# Patient Record
Sex: FEMALE | Race: WHITE | NOT HISPANIC OR LATINO | Employment: OTHER | URBAN - METROPOLITAN AREA
[De-identification: names, ages, dates, MRNs, and addresses within clinical notes are randomized per-mention and may not be internally consistent; named-entity substitution may affect disease eponyms.]

---

## 2023-05-09 LAB
CALCIDIOL (25 OH VITAMIN D3) (NG/ML) IN SER/PLAS: 67 NG/ML
THYROTROPIN (MIU/L) IN SER/PLAS BY DETECTION LIMIT <= 0.05 MIU/L: 1.28 MIU/L (ref 0.44–3.98)

## 2023-11-14 ENCOUNTER — HOSPITAL ENCOUNTER (OUTPATIENT)
Dept: CARDIOLOGY | Facility: HOSPITAL | Age: 73
Discharge: HOME | End: 2023-11-14
Payer: MEDICARE

## 2023-11-14 DIAGNOSIS — R07.89 CHEST TIGHTNESS: ICD-10-CM

## 2023-11-14 LAB
AORTIC VALVE PEAK VELOCITY: 1.32
AV PEAK GRADIENT: 7
AVA (PEAK VEL): 1.69
EJECTION FRACTION APICAL 4 CHAMBER: 59.2
EJECTION FRACTION: 60
LEFT VENTRICLE INTERNAL DIMENSION DIASTOLE: 4 (ref 3.5–6)
LEFT VENTRICULAR OUTFLOW TRACT DIAMETER: 1.9
MITRAL VALVE E/A RATIO: 1.08
RIGHT VENTRICLE PEAK SYSTOLIC PRESSURE: 19.2
TRICUSPID ANNULAR PLANE SYSTOLIC EXCURSION: 2.1

## 2023-11-14 PROCEDURE — 93306 TTE W/DOPPLER COMPLETE: CPT | Performed by: INTERNAL MEDICINE

## 2023-11-14 PROCEDURE — 93306 TTE W/DOPPLER COMPLETE: CPT

## 2023-11-17 ENCOUNTER — APPOINTMENT (OUTPATIENT)
Dept: RADIOLOGY | Facility: HOSPITAL | Age: 73
End: 2023-11-17
Payer: MEDICARE

## 2023-11-17 ENCOUNTER — HOSPITAL ENCOUNTER (OUTPATIENT)
Dept: CARDIOLOGY | Facility: HOSPITAL | Age: 73
Discharge: HOME | End: 2023-11-17
Payer: MEDICARE

## 2023-11-17 ENCOUNTER — HOSPITAL ENCOUNTER (OUTPATIENT)
Facility: HOSPITAL | Age: 73
Setting detail: OBSERVATION
Discharge: HOME | End: 2023-11-17
Attending: STUDENT IN AN ORGANIZED HEALTH CARE EDUCATION/TRAINING PROGRAM | Admitting: STUDENT IN AN ORGANIZED HEALTH CARE EDUCATION/TRAINING PROGRAM
Payer: MEDICARE

## 2023-11-17 ENCOUNTER — APPOINTMENT (OUTPATIENT)
Dept: CARDIOLOGY | Facility: HOSPITAL | Age: 73
End: 2023-11-17
Payer: MEDICARE

## 2023-11-17 VITALS
OXYGEN SATURATION: 94 % | BODY MASS INDEX: 26.53 KG/M2 | DIASTOLIC BLOOD PRESSURE: 57 MMHG | TEMPERATURE: 96.6 F | SYSTOLIC BLOOD PRESSURE: 129 MMHG | HEIGHT: 60 IN | RESPIRATION RATE: 17 BRPM | WEIGHT: 135.14 LBS | HEART RATE: 71 BPM

## 2023-11-17 DIAGNOSIS — I49.8 ATRIAL ARRHYTHMIA: ICD-10-CM

## 2023-11-17 DIAGNOSIS — R07.9 CHEST PAIN, UNSPECIFIED TYPE: Primary | ICD-10-CM

## 2023-11-17 LAB
ALBUMIN SERPL BCP-MCNC: 4.3 G/DL (ref 3.4–5)
ALP SERPL-CCNC: 50 U/L (ref 33–136)
ALT SERPL W P-5'-P-CCNC: 16 U/L (ref 7–45)
ANION GAP SERPL CALC-SCNC: 11 MMOL/L (ref 10–20)
AST SERPL W P-5'-P-CCNC: 18 U/L (ref 9–39)
ATRIAL RATE: 87 BPM
BASOPHILS # BLD AUTO: 0.02 X10*3/UL (ref 0–0.1)
BASOPHILS NFR BLD AUTO: 0.2 %
BILIRUB SERPL-MCNC: 0.5 MG/DL (ref 0–1.2)
BUN SERPL-MCNC: 19 MG/DL (ref 6–23)
CALCIUM SERPL-MCNC: 9.9 MG/DL (ref 8.6–10.3)
CARDIAC TROPONIN I PNL SERPL HS: 4 NG/L (ref 0–13)
CARDIAC TROPONIN I PNL SERPL HS: 4 NG/L (ref 0–13)
CARDIAC TROPONIN I PNL SERPL HS: 5 NG/L (ref 0–13)
CHLORIDE SERPL-SCNC: 104 MMOL/L (ref 98–107)
CHOLEST SERPL-MCNC: 221 MG/DL (ref 0–199)
CHOLESTEROL/HDL RATIO: 4.1
CO2 SERPL-SCNC: 29 MMOL/L (ref 21–32)
CREAT SERPL-MCNC: 0.62 MG/DL (ref 0.5–1.05)
EOSINOPHIL # BLD AUTO: 0.22 X10*3/UL (ref 0–0.4)
EOSINOPHIL NFR BLD AUTO: 1.8 %
ERYTHROCYTE [DISTWIDTH] IN BLOOD BY AUTOMATED COUNT: 14.5 % (ref 11.5–14.5)
EST. AVERAGE GLUCOSE BLD GHB EST-MCNC: 117 MG/DL
GFR SERPL CREATININE-BSD FRML MDRD: >90 ML/MIN/1.73M*2
GLUCOSE SERPL-MCNC: 97 MG/DL (ref 74–99)
HBA1C MFR BLD: 5.7 %
HCT VFR BLD AUTO: 46.7 % (ref 36–46)
HDLC SERPL-MCNC: 53.8 MG/DL
HGB BLD-MCNC: 14.7 G/DL (ref 12–16)
IMM GRANULOCYTES # BLD AUTO: 0.06 X10*3/UL (ref 0–0.5)
IMM GRANULOCYTES NFR BLD AUTO: 0.5 % (ref 0–0.9)
LDLC SERPL CALC-MCNC: 141 MG/DL
LYMPHOCYTES # BLD AUTO: 2.16 X10*3/UL (ref 0.8–3)
LYMPHOCYTES NFR BLD AUTO: 17.3 %
MCH RBC QN AUTO: 29.5 PG (ref 26–34)
MCHC RBC AUTO-ENTMCNC: 31.5 G/DL (ref 32–36)
MCV RBC AUTO: 94 FL (ref 80–100)
MONOCYTES # BLD AUTO: 0.96 X10*3/UL (ref 0.05–0.8)
MONOCYTES NFR BLD AUTO: 7.7 %
NEUTROPHILS # BLD AUTO: 9.04 X10*3/UL (ref 1.6–5.5)
NEUTROPHILS NFR BLD AUTO: 72.5 %
NON HDL CHOLESTEROL: 167 MG/DL (ref 0–149)
NRBC BLD-RTO: 0 /100 WBCS (ref 0–0)
P AXIS: 81 DEGREES
P OFFSET: 194 MS
P ONSET: 146 MS
PLATELET # BLD AUTO: 233 X10*3/UL (ref 150–450)
POTASSIUM SERPL-SCNC: 4 MMOL/L (ref 3.5–5.3)
PR INTERVAL: 158 MS
PROT SERPL-MCNC: 7.2 G/DL (ref 6.4–8.2)
Q ONSET: 225 MS
QRS COUNT: 14 BEATS
QRS DURATION: 74 MS
QT INTERVAL: 348 MS
QTC CALCULATION(BAZETT): 418 MS
QTC FREDERICIA: 393 MS
R AXIS: 5 DEGREES
RBC # BLD AUTO: 4.99 X10*6/UL (ref 4–5.2)
SODIUM SERPL-SCNC: 140 MMOL/L (ref 136–145)
T AXIS: 76 DEGREES
T OFFSET: 399 MS
TRIGL SERPL-MCNC: 129 MG/DL (ref 0–149)
VENTRICULAR RATE: 87 BPM
VLDL: 26 MG/DL (ref 0–40)
WBC # BLD AUTO: 12.5 X10*3/UL (ref 4.4–11.3)

## 2023-11-17 PROCEDURE — 93270 REMOTE 30 DAY ECG REV/REPORT: CPT

## 2023-11-17 PROCEDURE — 80053 COMPREHEN METABOLIC PANEL: CPT | Performed by: EMERGENCY MEDICINE

## 2023-11-17 PROCEDURE — 99285 EMERGENCY DEPT VISIT HI MDM: CPT | Mod: 25

## 2023-11-17 PROCEDURE — 93272 ECG/REVIEW INTERPRET ONLY: CPT | Performed by: INTERNAL MEDICINE

## 2023-11-17 PROCEDURE — 2500000001 HC RX 250 WO HCPCS SELF ADMINISTERED DRUGS (ALT 637 FOR MEDICARE OP): Performed by: EMERGENCY MEDICINE

## 2023-11-17 PROCEDURE — 83036 HEMOGLOBIN GLYCOSYLATED A1C: CPT | Performed by: STUDENT IN AN ORGANIZED HEALTH CARE EDUCATION/TRAINING PROGRAM

## 2023-11-17 PROCEDURE — 99239 HOSP IP/OBS DSCHRG MGMT >30: CPT | Performed by: STUDENT IN AN ORGANIZED HEALTH CARE EDUCATION/TRAINING PROGRAM

## 2023-11-17 PROCEDURE — 85025 COMPLETE CBC W/AUTO DIFF WBC: CPT | Performed by: EMERGENCY MEDICINE

## 2023-11-17 PROCEDURE — 36415 COLL VENOUS BLD VENIPUNCTURE: CPT | Performed by: EMERGENCY MEDICINE

## 2023-11-17 PROCEDURE — 84484 ASSAY OF TROPONIN QUANT: CPT | Performed by: STUDENT IN AN ORGANIZED HEALTH CARE EDUCATION/TRAINING PROGRAM

## 2023-11-17 PROCEDURE — 36415 COLL VENOUS BLD VENIPUNCTURE: CPT | Performed by: STUDENT IN AN ORGANIZED HEALTH CARE EDUCATION/TRAINING PROGRAM

## 2023-11-17 PROCEDURE — 71045 X-RAY EXAM CHEST 1 VIEW: CPT | Mod: FY

## 2023-11-17 PROCEDURE — 93005 ELECTROCARDIOGRAM TRACING: CPT | Mod: 59

## 2023-11-17 PROCEDURE — 71045 X-RAY EXAM CHEST 1 VIEW: CPT | Performed by: RADIOLOGY

## 2023-11-17 PROCEDURE — 83036 HEMOGLOBIN GLYCOSYLATED A1C: CPT | Mod: STJLAB | Performed by: STUDENT IN AN ORGANIZED HEALTH CARE EDUCATION/TRAINING PROGRAM

## 2023-11-17 PROCEDURE — 2500000001 HC RX 250 WO HCPCS SELF ADMINISTERED DRUGS (ALT 637 FOR MEDICARE OP): Performed by: STUDENT IN AN ORGANIZED HEALTH CARE EDUCATION/TRAINING PROGRAM

## 2023-11-17 PROCEDURE — G0378 HOSPITAL OBSERVATION PER HR: HCPCS

## 2023-11-17 PROCEDURE — 84484 ASSAY OF TROPONIN QUANT: CPT | Performed by: EMERGENCY MEDICINE

## 2023-11-17 PROCEDURE — 80061 LIPID PANEL: CPT | Performed by: STUDENT IN AN ORGANIZED HEALTH CARE EDUCATION/TRAINING PROGRAM

## 2023-11-17 RX ORDER — ENOXAPARIN SODIUM 100 MG/ML
40 INJECTION SUBCUTANEOUS EVERY 24 HOURS
Status: DISCONTINUED | OUTPATIENT
Start: 2023-11-17 | End: 2023-11-17 | Stop reason: HOSPADM

## 2023-11-17 RX ORDER — NITROGLYCERIN 0.4 MG/1
0.4 TABLET SUBLINGUAL EVERY 5 MIN PRN
Status: DISCONTINUED | OUTPATIENT
Start: 2023-11-17 | End: 2023-11-17 | Stop reason: HOSPADM

## 2023-11-17 RX ORDER — NAPROXEN SODIUM 220 MG/1
324 TABLET, FILM COATED ORAL ONCE
Status: COMPLETED | OUTPATIENT
Start: 2023-11-17 | End: 2023-11-17

## 2023-11-17 RX ORDER — NAPROXEN SODIUM 220 MG/1
81 TABLET, FILM COATED ORAL DAILY
Qty: 30 TABLET | Refills: 0 | Status: SHIPPED | OUTPATIENT
Start: 2023-11-18 | End: 2023-12-04 | Stop reason: WASHOUT

## 2023-11-17 RX ORDER — ACETAMINOPHEN 325 MG/1
650 TABLET ORAL EVERY 4 HOURS PRN
Status: DISCONTINUED | OUTPATIENT
Start: 2023-11-17 | End: 2023-11-17 | Stop reason: HOSPADM

## 2023-11-17 RX ORDER — CHOLECALCIFEROL (VITAMIN D3) 50 MCG
50 TABLET ORAL DAILY
COMMUNITY

## 2023-11-17 RX ORDER — PRAVASTATIN SODIUM 80 MG/1
80 TABLET ORAL DAILY
COMMUNITY

## 2023-11-17 RX ORDER — CALCIUM CARBONATE 500(1250)
2 TABLET ORAL DAILY
COMMUNITY

## 2023-11-17 RX ORDER — ATORVASTATIN CALCIUM 80 MG/1
80 TABLET, FILM COATED ORAL NIGHTLY
Status: DISCONTINUED | OUTPATIENT
Start: 2023-11-17 | End: 2023-11-17 | Stop reason: HOSPADM

## 2023-11-17 RX ORDER — MORPHINE SULFATE 2 MG/ML
2 INJECTION, SOLUTION INTRAMUSCULAR; INTRAVENOUS EVERY 4 HOURS PRN
Status: DISCONTINUED | OUTPATIENT
Start: 2023-11-17 | End: 2023-11-17 | Stop reason: HOSPADM

## 2023-11-17 RX ORDER — IBUPROFEN 100 MG/5ML
1000 SUSPENSION, ORAL (FINAL DOSE FORM) ORAL DAILY
COMMUNITY

## 2023-11-17 RX ORDER — NAPROXEN SODIUM 220 MG/1
81 TABLET, FILM COATED ORAL DAILY
Status: DISCONTINUED | OUTPATIENT
Start: 2023-11-17 | End: 2023-11-17 | Stop reason: HOSPADM

## 2023-11-17 RX ORDER — LANOLIN ALCOHOL/MO/W.PET/CERES
1000 CREAM (GRAM) TOPICAL DAILY
COMMUNITY

## 2023-11-17 RX ORDER — ONDANSETRON HYDROCHLORIDE 2 MG/ML
4 INJECTION, SOLUTION INTRAVENOUS EVERY 6 HOURS PRN
Status: DISCONTINUED | OUTPATIENT
Start: 2023-11-17 | End: 2023-11-17 | Stop reason: HOSPADM

## 2023-11-17 RX ORDER — NITROGLYCERIN 0.4 MG/1
0.4 TABLET SUBLINGUAL ONCE
Status: COMPLETED | OUTPATIENT
Start: 2023-11-17 | End: 2023-11-17

## 2023-11-17 RX ORDER — PROPYLENE GLYCOL 0.06 MG/ML
1 EMULSION OPHTHALMIC 2 TIMES DAILY PRN
COMMUNITY

## 2023-11-17 RX ADMIN — NITROGLYCERIN 0.4 MG: 0.4 TABLET SUBLINGUAL at 06:38

## 2023-11-17 RX ADMIN — ASPIRIN 81 MG CHEWABLE TABLET 81 MG: 81 TABLET CHEWABLE at 10:53

## 2023-11-17 RX ADMIN — ASPIRIN 81 MG CHEWABLE TABLET 324 MG: 81 TABLET CHEWABLE at 06:38

## 2023-11-17 SDOH — SOCIAL STABILITY: SOCIAL INSECURITY: DOES ANYONE TRY TO KEEP YOU FROM HAVING/CONTACTING OTHER FRIENDS OR DOING THINGS OUTSIDE YOUR HOME?: NO

## 2023-11-17 SDOH — SOCIAL STABILITY: SOCIAL INSECURITY: DO YOU FEEL UNSAFE GOING BACK TO THE PLACE WHERE YOU ARE LIVING?: NO

## 2023-11-17 SDOH — SOCIAL STABILITY: SOCIAL INSECURITY: ARE THERE ANY APPARENT SIGNS OF INJURIES/BEHAVIORS THAT COULD BE RELATED TO ABUSE/NEGLECT?: NO

## 2023-11-17 SDOH — SOCIAL STABILITY: SOCIAL INSECURITY: HAS ANYONE EVER THREATENED TO HURT YOUR FAMILY OR YOUR PETS?: NO

## 2023-11-17 SDOH — SOCIAL STABILITY: SOCIAL INSECURITY: ARE YOU OR HAVE YOU BEEN THREATENED OR ABUSED PHYSICALLY, EMOTIONALLY, OR SEXUALLY BY ANYONE?: NO

## 2023-11-17 SDOH — SOCIAL STABILITY: SOCIAL INSECURITY: ABUSE: ADULT

## 2023-11-17 SDOH — SOCIAL STABILITY: SOCIAL INSECURITY: HAVE YOU HAD THOUGHTS OF HARMING ANYONE ELSE?: NO

## 2023-11-17 ASSESSMENT — ACTIVITIES OF DAILY LIVING (ADL)
PATIENT'S MEMORY ADEQUATE TO SAFELY COMPLETE DAILY ACTIVITIES?: YES
WALKS IN HOME: INDEPENDENT
BATHING: INDEPENDENT
ADEQUATE_TO_COMPLETE_ADL: YES
DRESSING YOURSELF: INDEPENDENT
TOILETING: INDEPENDENT
FEEDING YOURSELF: INDEPENDENT
GROOMING: INDEPENDENT
HEARING - RIGHT EAR: FUNCTIONAL
HEARING - LEFT EAR: FUNCTIONAL
JUDGMENT_ADEQUATE_SAFELY_COMPLETE_DAILY_ACTIVITIES: YES

## 2023-11-17 ASSESSMENT — LIFESTYLE VARIABLES
SUBSTANCE_ABUSE_PAST_12_MONTHS: NO
PRESCIPTION_ABUSE_PAST_12_MONTHS: NO
AUDIT-C TOTAL SCORE: 0
HOW OFTEN DO YOU HAVE 6 OR MORE DRINKS ON ONE OCCASION: NEVER
EVER HAD A DRINK FIRST THING IN THE MORNING TO STEADY YOUR NERVES TO GET RID OF A HANGOVER: NO
SKIP TO QUESTIONS 9-10: 1
HOW MANY STANDARD DRINKS CONTAINING ALCOHOL DO YOU HAVE ON A TYPICAL DAY: PATIENT DOES NOT DRINK
AUDIT-C TOTAL SCORE: 0
HOW OFTEN DO YOU HAVE A DRINK CONTAINING ALCOHOL: NEVER
HAVE YOU EVER FELT YOU SHOULD CUT DOWN ON YOUR DRINKING: NO
REASON UNABLE TO ASSESS: NO
HAVE PEOPLE ANNOYED YOU BY CRITICIZING YOUR DRINKING: NO
EVER FELT BAD OR GUILTY ABOUT YOUR DRINKING: NO

## 2023-11-17 ASSESSMENT — COGNITIVE AND FUNCTIONAL STATUS - GENERAL
DAILY ACTIVITIY SCORE: 24
MOBILITY SCORE: 24

## 2023-11-17 ASSESSMENT — COLUMBIA-SUICIDE SEVERITY RATING SCALE - C-SSRS
2. HAVE YOU ACTUALLY HAD ANY THOUGHTS OF KILLING YOURSELF?: NO
1. IN THE PAST MONTH, HAVE YOU WISHED YOU WERE DEAD OR WISHED YOU COULD GO TO SLEEP AND NOT WAKE UP?: NO
2. HAVE YOU ACTUALLY HAD ANY THOUGHTS OF KILLING YOURSELF?: NO
6. HAVE YOU EVER DONE ANYTHING, STARTED TO DO ANYTHING, OR PREPARED TO DO ANYTHING TO END YOUR LIFE?: NO
1. IN THE PAST MONTH, HAVE YOU WISHED YOU WERE DEAD OR WISHED YOU COULD GO TO SLEEP AND NOT WAKE UP?: NO
6. HAVE YOU EVER DONE ANYTHING, STARTED TO DO ANYTHING, OR PREPARED TO DO ANYTHING TO END YOUR LIFE?: NO

## 2023-11-17 ASSESSMENT — PAIN SCALES - GENERAL
PAINLEVEL_OUTOF10: 8
PAINLEVEL_OUTOF10: 8

## 2023-11-17 ASSESSMENT — PATIENT HEALTH QUESTIONNAIRE - PHQ9
1. LITTLE INTEREST OR PLEASURE IN DOING THINGS: NOT AT ALL
2. FEELING DOWN, DEPRESSED OR HOPELESS: NOT AT ALL
SUM OF ALL RESPONSES TO PHQ9 QUESTIONS 1 & 2: 0

## 2023-11-17 ASSESSMENT — PAIN - FUNCTIONAL ASSESSMENT: PAIN_FUNCTIONAL_ASSESSMENT: 0-10

## 2023-11-17 ASSESSMENT — PAIN DESCRIPTION - DESCRIPTORS
DESCRIPTORS: PRESSURE
DESCRIPTORS: HEAVINESS;PRESSURE

## 2023-11-17 ASSESSMENT — PAIN DESCRIPTION - LOCATION: LOCATION: CHEST

## 2023-11-17 ASSESSMENT — PAIN DESCRIPTION - ORIENTATION: ORIENTATION: MID

## 2023-11-17 ASSESSMENT — PAIN DESCRIPTION - PAIN TYPE: TYPE: ACUTE PAIN

## 2023-11-17 NOTE — CONSULTS
Consults  History Of Present Illness:    Prerna Watkins is a 73 y.o. female presenting with chief complaint of chest pain that awoke her from sleep at 1 AM.  The patient did had a negative stress test less than 6 months ago.  On presentation she denied dizziness but reported chest pain 8 out of 10 in intensity worse than in the past..  EKG showed normal sinus rhythm with no ischemic change no clinical arrhythmias she was normal tensive in normal sinus rhythm on presentation.  Her cardiac markers are negative    In November patient was seen and evaluated by nuclear stress and echocardiogram was unremarkable the patient has a past history of cardiac arrhythmia status post ablation November 2017 [facility] per Dr. Rodrigo Curry which was for atrial flutter and paroxysmal atrial fibrillation history of hypertension hyperlipidemia she was treated with atenolol, flecainide reportedly had a remote history of laparoscopic appendectomy    CONCLUSIONS:    1. Left ventricular systolic function is normal with a 65-70% estimated ejection fraction.    2. Spectral Doppler shows an impaired relaxation pattern of left ventricular diastolic filling.    3. RVSP within normal limits.    4. Aortic valve sclerosis.    Nuclear stress test was normal read by Dr. Lin which was pharmacologic    IMPRESSION:  1. No nuclear evidence of pharmacologic stress-induced ischemia or  scar.  2. Vigorous LV function, EF greater than 80%, with normal wall motion  and thickening in all segments.  3. Based on these findings there is low likelihood of flow-limiting  coronary artery disease.      Known to her internist  who felt she had reproducible chest pain and noted previous ablation November 2017 negative stress test nuclear 2006 and recently in the past 6 months he gave the following medical history      EKG-normal  Stress test 2 months ago-good  Suggest echo to evaluate   Chest sore with palpation, movement-has tried Tylenol     Health  Maintenance Due   Topic Date Due   Colorectal Cancer Screening 2021   Influenza Vaccine (1) 2023     MEDICATIONS:  Current Outpatient Medications   Medication Instructions   ascorbic acid (Vitamin C) 500 mg/mL oral liquid Oral, Daily RT   Calcium Carbonate-Vitamin D 600-5 MG-MCG tablet Oral   Cobalamin Combinations (B-12) 100-5000 MCG sublingual tablet Every 24 hours   Cyanocobalamin 3000 MCG/ML liquid Sublingual   fluticasone (Flonase Allergy Relief) 50 MCG/ACT nasal spray Nasal   meclizine (Antivert) 25 MG tablet TAKE 1 TABLET BY MOUTH EVERY 6 HOURS AS NEEDED FOR VERTIGO   Multiple Vitamin (Multi-Vitamin) tablet 1 tablet, Oral, Daily RT   pravastatin (PRAVACHOL) 80 mg, Oral, Daily       ALLERGIES:  Allergies   Allergen Reactions   Hydrocodone Unknown   Levofloxacin   Other Reaction(s): arm/muscle pain   Other Itching   Watery eyes, runny nose   Nickel   Other Reaction(s): Urticaria       PAST MEDICAL HISTORY:  Patient Active Problem List   Diagnosis   Acquired absence of both cervix and uterus   Allergic rhinitis   Rhinitis   Appendicitis   A-fib (CMS/HCC)   Benign paroxysmal positional vertigo of right ear   Bilateral carotid artery stenosis   Bilateral leg cramps   Broken wrist   Chronic fatigue   Closed fracture of ankle   Contact dermatitis   GERD without esophagitis   Hearing loss   Hematuria   Herpes zoster   Insect sting, accidental or unintentional, initial encounter   Lichen sclerosus et atrophicus   HLD (hyperlipidemia) (CMS/HCC)   Mixed hyperlipidemia (CMS/HCC)   Osteoarthritis of knee, unspecified   Sinusitis, maxillary, chronic   Tubular adenoma   Urinary urgency   Vertigo   Routine general medical examination at a health care facility     SURGICAL HISTORY:  Past Surgical History:   Procedure Laterality Date   ANKLE FRACTURE SURGERY Left   APPENDECTOMY 2017   CARDIAC ELECTROPHYSIOLOGY MAPPING AND ABLATION 2017   CATARACT EXTRACTION 2018   OU cataracts    SECTION, LOW  TRANSVERSE   x2   COLONOSCOPY 04/2018   (tubular adenoma) due 2021   EYE SURGERY 1957   HYSTERECTOMY 01/17/2023   total ( Dr. aNsrin Mcdermott)   NUCLEAR STRESS TEST EXERCISE (CARD) 2006   Normal   SLEEP STUDY 2010   normal   WRIST FRACTURE SURGERY Right       FAMILY HISTORY:  Family History   Problem Relation Name Age of Onset   Cirrhosis Mother   Prostate cancer Father   COPD Father   No Known Problems Son   No Known Problems Daughter       SOCIAL HISTORY:  Social History      Last Recorded Vitals:  Vitals:    11/17/23 0715 11/17/23 0804 11/17/23 0840 11/17/23 0900   BP: 128/62 129/61 119/67 129/64   BP Location:    Right arm   Patient Position:    Sitting   Pulse: 68 64 66 69   Resp: 23 19 18 20   Temp:    35.9 °C (96.6 °F)   TempSrc:    Temporal   SpO2: 92% 95% 96% 95%   Weight:       Height:           Last Labs:  CBC - 11/17/2023:  6:12 AM  12.5 14.7 233    46.7      CMP - 11/17/2023:  6:12 AM  9.9 7.2 18 --- 0.5   _ 4.3 16 50      PTT - No results in last year.  _   _ _     Troponin I, High Sensitivity   Date/Time Value Ref Range Status   11/17/2023 06:09 AM 5 0 - 13 ng/L Final      Last I/O:  No intake/output data recorded.    Past Cardiology Tests (Last 3 Years):  EKG:  No results found for this or any previous visit from the past 1095 days.    Echo:  Transthoracic Echo (TTE) Complete 11/14/2023    Ejection Fractions:  EF   Date/Time Value Ref Range Status   11/14/2023 11:00 AM 60       Cath:  No results found for this or any previous visit from the past 1095 days.    Stress Test:  Nuclear Stress Test 5/13/2023    Cardiac Imaging:  No results found for this or any previous visit from the past 1095 days.      Past Medical History:  She has a past medical history of Acute appendicitis with localized peritonitis, without perforation or gangrene.    Past Surgical History:  She has a past surgical history that includes Appendectomy (07/24/2017).      Social History:  She has no history on file for tobacco use, alcohol  use, and drug use.    Family History:  No family history on file.     Allergies:  Patient has no known allergies.    Inpatient Medications:        Outpatient Medications:  Current Outpatient Medications   Medication Instructions    ascorbic acid (VITAMIN C) 1,000 mg, oral, Daily    calcium carbonate (Oscal) 500 mg calcium (1,250 mg) tablet 2 tablets, oral, Daily    cholecalciferol (VITAMIN D-3) 50 mcg, oral, Daily    cyanocobalamin (VITAMIN B-12) 1,000 mcg, oral, Daily    glucosamine/methylsulfonylmeth (GLUCOSAMINE MSM ORAL) 2 tablets, oral, Daily    pravastatin (PRAVACHOL) 80 mg, oral, Daily    propylene glycoL (Systane Balance) 0.6 % drops 1 drop, Both Eyes, 2 times daily PRN     Results for orders placed or performed during the hospital encounter of 11/17/23 (from the past 96 hour(s))   Troponin I, High Sensitivity   Result Value Ref Range    Troponin I, High Sensitivity 5 0 - 13 ng/L   CBC and Auto Differential   Result Value Ref Range    WBC 12.5 (H) 4.4 - 11.3 x10*3/uL    nRBC 0.0 0.0 - 0.0 /100 WBCs    RBC 4.99 4.00 - 5.20 x10*6/uL    Hemoglobin 14.7 12.0 - 16.0 g/dL    Hematocrit 46.7 (H) 36.0 - 46.0 %    MCV 94 80 - 100 fL    MCH 29.5 26.0 - 34.0 pg    MCHC 31.5 (L) 32.0 - 36.0 g/dL    RDW 14.5 11.5 - 14.5 %    Platelets 233 150 - 450 x10*3/uL    Neutrophils % 72.5 40.0 - 80.0 %    Immature Granulocytes %, Automated 0.5 0.0 - 0.9 %    Lymphocytes % 17.3 13.0 - 44.0 %    Monocytes % 7.7 2.0 - 10.0 %    Eosinophils % 1.8 0.0 - 6.0 %    Basophils % 0.2 0.0 - 2.0 %    Neutrophils Absolute 9.04 (H) 1.60 - 5.50 x10*3/uL    Immature Granulocytes Absolute, Automated 0.06 0.00 - 0.50 x10*3/uL    Lymphocytes Absolute 2.16 0.80 - 3.00 x10*3/uL    Monocytes Absolute 0.96 (H) 0.05 - 0.80 x10*3/uL    Eosinophils Absolute 0.22 0.00 - 0.40 x10*3/uL    Basophils Absolute 0.02 0.00 - 0.10 x10*3/uL   Comprehensive metabolic panel   Result Value Ref Range    Glucose 97 74 - 99 mg/dL    Sodium 140 136 - 145 mmol/L    Potassium  4.0 3.5 - 5.3 mmol/L    Chloride 104 98 - 107 mmol/L    Bicarbonate 29 21 - 32 mmol/L    Anion Gap 11 10 - 20 mmol/L    Urea Nitrogen 19 6 - 23 mg/dL    Creatinine 0.62 0.50 - 1.05 mg/dL    eGFR >90 >60 mL/min/1.73m*2    Calcium 9.9 8.6 - 10.3 mg/dL    Albumin 4.3 3.4 - 5.0 g/dL    Alkaline Phosphatase 50 33 - 136 U/L    Total Protein 7.2 6.4 - 8.2 g/dL    AST 18 9 - 39 U/L    Bilirubin, Total 0.5 0.0 - 1.2 mg/dL    ALT 16 7 - 45 U/L       Physical Exam:  Subjective:   Examination:   General Examination:   General Appearance: Well developed, well nourished, in no acute distress.   Head: normocephalic, atraumatic   Eyes: Anicteric sclera. Pupils are equally round and reactive to light.  Extraocular movements are intact.    Ears: normal   Oral: Cavity: mucosa moist.   Throat: clear.   Neck/Thyroid: neck supple, full range of motion, no cervical lymphadenopathy.   Skin: warm and dry, no suspicious lesions.    Heart: regular rate and rhythm, S1, S2 normal, no murmurs.   Lungs: clear to auscultation bilaterally.   Abdomen: soft, non-tender, non-distended, bowl sound present, normal.   Extremities: no clubbing, no cyanosis, no edema.   Neuro: non-focal, motor strength normal upper lower extremities, sensory exam intact.       Assessment/Plan   R07.9 chest pain after recent negative cardiac work-up negative cardiac markers EKG chest pain occurred awakening her from sleep in the past felt to have atypical chest pain reproducible by her internist  and has had several negative stress test including recent and 2006  I48.92 History of ablation 2017 by Dr. Rodrigo Curry    Chest pain appears somewhat atypical this was her first episode awakening her from sleep she has had normal echo and 2 negative stress test nuclear including recent she has no reproducible history of exertional type chest pain 1 possibility is breakthrough atrial arrhythmias ordered an event monitor at discharge after doing 3 cardiac troponins we will  have her follow-up with Dr. Lin who is office did the stress test if she has further episodes of exertional chest pain they can evaluate if an invasive procedure is truly needed    Code Status:  No Order    I spent 40 minutes in the professional and overall care of this patient.        Sp Shannon MD

## 2023-11-17 NOTE — CARE PLAN
The patient's goals for the shift include  remain  afebrile    The clinical goals for the shift include  have  decreased   chest  pain  Problem: Pain - Adult  Goal: Verbalizes/displays adequate comfort level or baseline comfort level  Outcome: Progressing     Problem: Safety - Adult  Goal: Free from fall injury  Outcome: Progressing     Problem: Discharge Planning  Goal: Discharge to home or other facility with appropriate resources  Outcome: Progressing     Problem: Chronic Conditions and Co-morbidities  Goal: Patient's chronic conditions and co-morbidity symptoms are monitored and maintained or improved  Outcome: Progressing

## 2023-11-17 NOTE — CARE PLAN
The patient's goals for the shift include  remain  afebrile    The clinical goals for the shift include  no  chest  pain  Problem: Pain - Adult  Goal: Verbalizes/displays adequate comfort level or baseline comfort level  11/17/2023 1656 by Fatuma Dutton RN  Outcome: Adequate for Discharge  11/17/2023 1520 by Fatuma Dutton RN  Outcome: Progressing     Problem: Safety - Adult  Goal: Free from fall injury  11/17/2023 1656 by Fatuma Dutton RN  Outcome: Adequate for Discharge  11/17/2023 1520 by Fatuma Dutton RN  Outcome: Progressing     Problem: Discharge Planning  Goal: Discharge to home or other facility with appropriate resources  11/17/2023 1656 by Fatuma Dutton RN  Outcome: Adequate for Discharge  11/17/2023 1520 by Fatuma Dutton RN  Outcome: Progressing     Problem: Chronic Conditions and Co-morbidities  Goal: Patient's chronic conditions and co-morbidity symptoms are monitored and maintained or improved  11/17/2023 1656 by Fatuma Dutton RN  Outcome: Adequate for Discharge  11/17/2023 1520 by Fatuma Dutton RN  Outcome: Progressing

## 2023-11-17 NOTE — PROGRESS NOTES
Met with the patient and her  in the room, she gave permission to speak with her  present. She states that she lives at home with her , is independent in her daily activities. She states she has no homegoing concerns, plans home discharge.

## 2023-11-17 NOTE — ED PROVIDER NOTES
HPI   Chief Complaint   Patient presents with    Chest Pain       This is a 73 years old female patient presented to the emergency department with a chief complaint of chest pain.  Patient states the pain woke her up from sleep around 1 AM.  It is in the middle of the chest, feels like heaviness/weight in the middle of her chest.  Patient denies radiation to the pain and stated that the pain has been constant since 1 AM till now.  Denies lightheadedness, dizziness, reported chest pain.  Stated that the pain is 8 out of 10 in severity.  Denies any headache, abdominal pain, diarrhea, constipation, weakness, or focal numbness.  Never had a chest pain like this in the past.    Review of system: As stated above in the HPI section.                          Brittany Coma Scale Score: 15                  Patient History   Past Medical History:   Diagnosis Date    Acute appendicitis with localized peritonitis, without perforation or gangrene     Acute appendicitis with localized peritonitis     Past Surgical History:   Procedure Laterality Date    APPENDECTOMY  07/24/2017    Laparoscopic Appendectomy     No family history on file.  Social History     Tobacco Use    Smoking status: Not on file    Smokeless tobacco: Not on file   Substance Use Topics    Alcohol use: Not on file    Drug use: Not on file       Physical Exam   ED Triage Vitals [11/17/23 0548]   Temp Heart Rate Resp BP   36.6 °C (97.9 °F) 71 20 156/78      SpO2 Temp Source Heart Rate Source Patient Position   98 % Temporal Monitor Sitting      BP Location FiO2 (%)     Right arm --       Physical Exam  Constitutional:       General: She is not in acute distress.     Appearance: She is well-developed. She is not ill-appearing.   Eyes:      Pupils: Pupils are equal, round, and reactive to light.   Cardiovascular:      Rate and Rhythm: Normal rate and regular rhythm.      Heart sounds: Normal heart sounds. Heart sounds not distant. No murmur heard.     No systolic  murmur is present.      No friction rub.   Pulmonary:      Effort: Pulmonary effort is normal. No tachypnea, accessory muscle usage or respiratory distress.      Breath sounds: No stridor.   Musculoskeletal:         General: Normal range of motion.      Cervical back: Normal range of motion.   Skin:     General: Skin is warm and dry.   Neurological:      General: No focal deficit present.      Mental Status: She is alert.         ED Course & MDM   Diagnoses as of 11/17/23 0749   Chest pain, unspecified type       Medical Decision Making  Patient seen and examined, presentation is concerning for acute coronary syndrome.  Will obtain full cardiac work-up including EKG, chest x-ray, and basic labs.  We will also obtain high sensitive troponin.    EKG revealed normal sinus rhythm, rate is 87 bpm, normal WA, QRS, normal QTc duration.  No ST segment or T wave pathology.  Good R wave progression throughout the precordial leads.    Will administer nitroglycerin, and aspirin.    Work-up is unremarkable, patient will be admitted to the hospitalist service given her age, comorbid conditions, and the onset of her chest pain for extended observation/rule out ACS.    Amount and/or Complexity of Data Reviewed  Labs: ordered. Decision-making details documented in ED Course.  Radiology: ordered. Decision-making details documented in ED Course.        Procedure  Procedures     Andrews Isabel, DO  11/17/23 0659       Andrews Isabel, DO  11/17/23 0750

## 2023-11-17 NOTE — DISCHARGE SUMMARY
Discharge Diagnosis  Chest pain    Issues Requiring Follow-Up  Chest pain   Cardiac Monitoring     Discharge Meds     Your medication list        START taking these medications        Instructions Last Dose Given Next Dose Due   aspirin 81 mg chewable tablet  Start taking on: November 18, 2023      Chew 1 tablet (81 mg) once daily. Do not start before November 18, 2023.              CONTINUE taking these medications        Instructions Last Dose Given Next Dose Due   ascorbic acid 1,000 mg tablet  Commonly known as: Vitamin C           calcium carbonate 500 mg calcium (1,250 mg) tablet  Commonly known as: Oscal           cholecalciferol 50 MCG (2000 UT) tablet  Commonly known as: Vitamin D-3           cyanocobalamin 1,000 mcg tablet  Commonly known as: Vitamin B-12           GLUCOSAMINE MSM ORAL           pravastatin 80 mg tablet  Commonly known as: Pravachol           Systane Balance 0.6 % drops  Generic drug: propylene glycoL                     Where to Get Your Medications        These medications were sent to [facility]/pharmacy #[id number] - [place], [place] - [address] [facility] [address]  [id number] [facility] [address]      Phone: [phone]   aspirin 81 mg chewable tablet         Test Results Pending At Discharge  Pending Labs       Order Current Status    Hemoglobin A1c In process            Hospital Course   This is a 73-year-old female who was admitted with atypical chest pain.  Chest pain resolved with aspirin and two nitroglycerin tabs. Cardiology was consulted. Troponins were negative x3. Pt had outpatient echo and stress test in recent months that were reviewed by both hospital medicine and cardiology. Holter monitor placed. Chest pain did not return.  Patient was determined to be medically stable for discharge home with instructions for follow-up.    D/c >30min    Pertinent Physical Exam At Time of Discharge  Physical Exam  Constitutional: Well developed, no distress, alert and  cooperative  Skin: Warm and dry  Eyes: EOMI, clear sclera  ENMT: mucous membranes moist  Respiratory: Patent airways, CTAB  Cardiovascular: Regular, rate and rhythm, chest pain not reproducible   Abdominal: Nondistended, soft, non-tender, +BS  MSK: ROM intact  Neuro: alert and oriented x3    Outpatient Follow-Up  No future appointments.  F/u PCP  F/u cardiology     Lilly Shabazz DO

## 2023-11-17 NOTE — NURSING NOTE
Pt.   Received  from  er   -  no  c/o  chest  pain  at present time.  She  was  seen  by  dr. Shannon.

## 2023-11-17 NOTE — H&P
History Of Present Illness  Prerna Watkins is a 73 y.o. female presenting for evaluation of chest pressure.  Patient reports that she woke up around 1 AM with what she describes as chest pressure.  She states that it continued to worsen and felt like she had a 300 pound weight sitting on top of her chest.  She had associated shortness of breath and felt like she could not take a deep breath.  She states that was about 8 out of 10 in severity.  After she states she received full dose aspirin and 2 nitro in the ED it did improve but did not completely resolve.  She has had several episodes of chest discomfort in the recent past.  She had a stress test approximately 6 months ago which was normal.  She also had a recent echo after she had similar symptoms after receiving her COVID vaccine.  It was ordered by her PCP.  She does not have a cardiologist.  She is currently feeling significantly improved from previous.    In the ED, vitals were stable.  Labs were significant for white count of 12.5.  Troponin was 5.  Chest x-ray showed slightly low lung volumes but no evidence of acute intrathoracic abnormality.  Patient was admitted for further evaluation of chest pain..       FULL CODE   Past Medical History  She has a past medical history of Acute appendicitis with localized peritonitis, without perforation or gangrene.  HLD  Surgical History  She has a past surgical history that includes Appendectomy (07/24/2017).   Total hysterectomy, ankle surgery, 2 c-sections  Social History  She has no history on file for tobacco use, alcohol use, and drug use.  Occasional alcohol use, denies tobacco or drug use   Family History  No family history on file.     Allergies  Patient has no known allergies.    Review of Systems   See HPI  Physical Exam   Constitutional: Well developed, no distress, alert and cooperative  Skin: Warm and dry  Eyes: EOMI, clear sclera  ENMT: mucous membranes moist  Respiratory: Patent airways,  CTAB  Cardiovascular: Regular, rate and rhythm, chest pain not reproducible   Abdominal: Nondistended, soft, non-tender, +BS  MSK: ROM intact  Neuro: alert and oriented x3    Last Recorded Vitals  /64 (BP Location: Right arm, Patient Position: Sitting)   Pulse 69   Temp 35.9 °C (96.6 °F) (Temporal)   Resp 20   Wt 61.3 kg (135 lb 2.3 oz)   SpO2 95%     Relevant Results  XR chest 1 view    Result Date: 11/17/2023  Interpreted By:  Kristian Lopez, STUDY: XR CHEST 1 VIEW   INDICATION: Signs/Symptoms:CHEST PAIN.   COMPARISON: May 10, 2023   ACCESSION NUMBER(S): [id number]   ORDERING CLINICIAN: FELECIA ANTOINE   FINDINGS: Slightly low lung volumes. No consolidation, effusion, edema, or pneumothorax. Heart size within normal limits.       Slightly low lung volumes but no evidence of acute intrathoracic abnormality.   Signed by: Kristian Lopez 11/17/2023 7:12 AM Dictation workstation:   [id number]      Results for orders placed or performed during the hospital encounter of 11/17/23 (from the past 24 hour(s))   Troponin I, High Sensitivity   Result Value Ref Range    Troponin I, High Sensitivity 5 0 - 13 ng/L   CBC and Auto Differential   Result Value Ref Range    WBC 12.5 (H) 4.4 - 11.3 x10*3/uL    nRBC 0.0 0.0 - 0.0 /100 WBCs    RBC 4.99 4.00 - 5.20 x10*6/uL    Hemoglobin 14.7 12.0 - 16.0 g/dL    Hematocrit 46.7 (H) 36.0 - 46.0 %    MCV 94 80 - 100 fL    MCH 29.5 26.0 - 34.0 pg    MCHC 31.5 (L) 32.0 - 36.0 g/dL    RDW 14.5 11.5 - 14.5 %    Platelets 233 150 - 450 x10*3/uL    Neutrophils % 72.5 40.0 - 80.0 %    Immature Granulocytes %, Automated 0.5 0.0 - 0.9 %    Lymphocytes % 17.3 13.0 - 44.0 %    Monocytes % 7.7 2.0 - 10.0 %    Eosinophils % 1.8 0.0 - 6.0 %    Basophils % 0.2 0.0 - 2.0 %    Neutrophils Absolute 9.04 (H) 1.60 - 5.50 x10*3/uL    Immature Granulocytes Absolute, Automated 0.06 0.00 - 0.50 x10*3/uL    Lymphocytes Absolute 2.16 0.80 - 3.00 x10*3/uL    Monocytes Absolute 0.96 (H) 0.05 - 0.80 x10*3/uL     Eosinophils Absolute 0.22 0.00 - 0.40 x10*3/uL    Basophils Absolute 0.02 0.00 - 0.10 x10*3/uL   Comprehensive metabolic panel   Result Value Ref Range    Glucose 97 74 - 99 mg/dL    Sodium 140 136 - 145 mmol/L    Potassium 4.0 3.5 - 5.3 mmol/L    Chloride 104 98 - 107 mmol/L    Bicarbonate 29 21 - 32 mmol/L    Anion Gap 11 10 - 20 mmol/L    Urea Nitrogen 19 6 - 23 mg/dL    Creatinine 0.62 0.50 - 1.05 mg/dL    eGFR >90 >60 mL/min/1.73m*2    Calcium 9.9 8.6 - 10.3 mg/dL    Albumin 4.3 3.4 - 5.0 g/dL    Alkaline Phosphatase 50 33 - 136 U/L    Total Protein 7.2 6.4 - 8.2 g/dL    AST 18 9 - 39 U/L    Bilirubin, Total 0.5 0.0 - 1.2 mg/dL    ALT 16 7 - 45 U/L         Assessment/Plan   Principal Problem:    Chest pain  HLD    -chest pain improved sublingual nitro x2  -Trop 5  -Continue to trend troponins  -Telemetry monitoring  -Aspirin loaded in the ED  -Continue aspirin 81 mg daily  -Start atorvastatin 80 mg daily  -echo 11/23  -stress 5/23 - no stress-induced ischemia or scar, EF >80%  -F/u lipid panel and Hga1c   -Cardiology is consulted  -Continue home medications when reconciled    FENGI   -encourage oral hydration   -replete electrolytes PRN  -caridac diet  -GI ppx: n/a  -DVT ppx: SCDs, lovenox     Dispo: This is a 73-year-old female who was admitted with atypical chest pain.  Cardiology is consulted.  Trending troponins.  Anticipate discharge within 24 hours.    Lilly Shabazz DO

## 2023-11-20 ENCOUNTER — TELEPHONE (OUTPATIENT)
Dept: CARDIOLOGY | Facility: CLINIC | Age: 73
End: 2023-11-20
Payer: MEDICARE

## 2023-11-20 NOTE — TELEPHONE ENCOUNTER
She was in the ER at [facility] for chest pain I did try to make her appointment but the first opening is in Jan 24 I also told her we can make a appointment for with Yelena but she said she didn't want to see Yelena.

## 2023-11-29 RX ORDER — MECLIZINE HYDROCHLORIDE 25 MG/1
25 TABLET ORAL 3 TIMES DAILY PRN
COMMUNITY
Start: 2022-08-03

## 2023-11-29 RX ORDER — ACETAMINOPHEN 500 MG
1 TABLET ORAL DAILY
COMMUNITY
End: 2023-12-04 | Stop reason: WASHOUT

## 2023-11-29 RX ORDER — MIRABEGRON 25 MG/1
25 TABLET, FILM COATED, EXTENDED RELEASE ORAL DAILY
COMMUNITY
Start: 2022-01-12 | End: 2023-12-04 | Stop reason: ALTCHOICE

## 2023-11-29 RX ORDER — GLUCOSAMINE/CHONDR SU A SOD 750-600 MG
2 TABLET ORAL DAILY
COMMUNITY

## 2023-11-29 RX ORDER — MULTIVITAMIN
1 TABLET ORAL
COMMUNITY

## 2023-11-29 RX ORDER — CYANOCOBALAMIN/FOLIC ACID 1MG-400MCG
LOZENGE SUBLINGUAL
COMMUNITY
End: 2023-12-04 | Stop reason: WASHOUT

## 2023-11-29 RX ORDER — CLOBETASOL PROPIONATE 0.5 MG/G
OINTMENT TOPICAL 2 TIMES DAILY
COMMUNITY
Start: 2022-02-11 | End: 2023-12-04

## 2023-11-29 RX ORDER — FLECAINIDE ACETATE 100 MG/1
100 TABLET ORAL 2 TIMES DAILY
COMMUNITY
Start: 2017-12-17 | End: 2023-12-04 | Stop reason: ALTCHOICE

## 2023-11-29 RX ORDER — CALCIUM CARBONATE/VITAMIN D3 600MG-5MCG
1 TABLET ORAL
COMMUNITY
End: 2023-12-04 | Stop reason: WASHOUT

## 2023-11-29 RX ORDER — ATENOLOL 25 MG/1
25 TABLET ORAL DAILY
COMMUNITY
Start: 2017-11-23 | End: 2023-12-04

## 2023-11-29 RX ORDER — SIMVASTATIN 10 MG/1
10 TABLET, FILM COATED ORAL NIGHTLY
COMMUNITY
End: 2023-12-04 | Stop reason: WASHOUT

## 2023-11-29 RX ORDER — FLUTICASONE PROPIONATE 50 MCG
SPRAY, SUSPENSION (ML) NASAL
COMMUNITY

## 2023-12-04 ENCOUNTER — OFFICE VISIT (OUTPATIENT)
Dept: CARDIOLOGY | Facility: CLINIC | Age: 73
End: 2023-12-04
Payer: MEDICARE

## 2023-12-04 VITALS
HEIGHT: 62 IN | HEART RATE: 66 BPM | SYSTOLIC BLOOD PRESSURE: 118 MMHG | WEIGHT: 130 LBS | BODY MASS INDEX: 23.92 KG/M2 | DIASTOLIC BLOOD PRESSURE: 70 MMHG | OXYGEN SATURATION: 96 %

## 2023-12-04 DIAGNOSIS — R07.9 CHEST PAIN, UNSPECIFIED TYPE: Primary | ICD-10-CM

## 2023-12-04 DIAGNOSIS — E78.5 DYSLIPIDEMIA: ICD-10-CM

## 2023-12-04 DIAGNOSIS — I48.20 CHRONIC ATRIAL FIBRILLATION (MULTI): ICD-10-CM

## 2023-12-04 PROCEDURE — 1160F RVW MEDS BY RX/DR IN RCRD: CPT | Performed by: INTERNAL MEDICINE

## 2023-12-04 PROCEDURE — 1036F TOBACCO NON-USER: CPT | Performed by: INTERNAL MEDICINE

## 2023-12-04 PROCEDURE — 99214 OFFICE O/P EST MOD 30 MIN: CPT | Performed by: INTERNAL MEDICINE

## 2023-12-04 PROCEDURE — 1126F AMNT PAIN NOTED NONE PRSNT: CPT | Performed by: INTERNAL MEDICINE

## 2023-12-04 PROCEDURE — 1159F MED LIST DOCD IN RCRD: CPT | Performed by: INTERNAL MEDICINE

## 2023-12-04 PROCEDURE — 93000 ELECTROCARDIOGRAM COMPLETE: CPT | Performed by: INTERNAL MEDICINE

## 2023-12-04 ASSESSMENT — PAIN SCALES - GENERAL: PAINLEVEL: 0-NO PAIN

## 2023-12-04 ASSESSMENT — ENCOUNTER SYMPTOMS
LOSS OF SENSATION IN FEET: 0
DEPRESSION: 1
OCCASIONAL FEELINGS OF UNSTEADINESS: 0

## 2023-12-04 NOTE — PROGRESS NOTES
Chief Complaint:   No chief complaint on file.     History Of Present Illness:    Prerna Watkins is a 73 y.o. female with a history of atrial fibrillation (A-fib ablation November 2017), sinus node dysfunction, and dyslipidemia here for hospital follow-up.    Was seen at [facility] [place] in November 2023 with chest pain.  Workup was negative including a stress test demonstrating no evidence of ischemia or scar.    Since leaving the hospital she has had no recurrence of chest pain.    The chest pain occurred in the middle of the night.  She awoke with it.  She went to the other room but then started having shortness of breath.  Came to the hospital and her symptoms resolved.    She has been tested for sleep apnea in the past but it has been several years.  She usually awakens a couple times a night to urinate.  Her  has not specifically noticed apneic episodes.    She is still wearing an ambulatory monitor.    Lexiscan nuclear stress test 5/12/2023: No evidence of ischemia or scar.  EF greater than 80%.    Echocardiogram 2/8/2018: EF 71%.  Grade 1 diastolic dysfunction.    A-fib ablation November 2017 by Dr. Curry     Past Medical History:  She has a past medical history of Acute appendicitis with localized peritonitis, without perforation or gangrene.    Past Surgical History:  She has a past surgical history that includes Appendectomy (07/24/2017).      Social History:  She reports that she has never smoked. She has never used smokeless tobacco. She reports that she does not drink alcohol and does not use drugs.    Family History:  No family history on file.     Allergies:  Hydrocodone, Levofloxacin, Other, and Nickel    Outpatient Medications:  Current Outpatient Medications   Medication Instructions    ascorbic acid (VITAMIN C) 1,000 mg, oral, Daily    aspirin 81 mg, oral, Daily    calcium carbonate (Oscal) 500 mg calcium (1,250 mg) tablet 2 tablets, oral, Daily    cholecalciferol (VITAMIN D-3) 50  "mcg, oral, Daily    cyanocobalamin (VITAMIN B-12) 1,000 mcg, oral, Daily    fluticasone (Flonase) 50 mcg/actuation nasal spray nasal    glucosamine HCl 1,500 mg tablet 2 tablets, oral, Daily    meclizine (ANTIVERT) 25 mg, oral, 3 times daily PRN    multivitamin tablet 1 tablet, oral, Daily RT    pravastatin (PRAVACHOL) 80 mg, oral, Daily    propylene glycoL (Systane Balance) 0.6 % drops 1 drop, Both Eyes, 2 times daily PRN       Last Recorded Vitals:  Visit Vitals  /70 (BP Location: Right arm, Patient Position: Sitting)   Pulse 66   Ht 1.575 m (5' 2\")   Wt 59 kg (130 lb)   SpO2 96%   BMI 23.78 kg/m²   Smoking Status Never   BSA 1.61 m²      LASTWT(3):   Wt Readings from Last 3 Encounters:   12/04/23 59 kg (130 lb)   11/17/23 61.3 kg (135 lb 2.3 oz)       Physical Exam:  In general: alert and in no acute distress.   HEENT: Carotid upstrokes normal with no bruits. JVP is normal.   Pulmonary: Clear to auscultation bilaterally.  Cardiovascular: S1,S2, regular. No appreciable murmurs, rubs or gallops.   Lower extremities: Warm. 2+ distal pulses. No edema.     Last Labs:  CBC -  Recent Labs     11/17/23 0612   WBC 12.5*   HGB 14.7   HCT 46.7*      MCV 94       CMP -  Recent Labs     11/17/23 0612      K 4.0      CO2 29   ANIONGAP 11   BUN 19   CREATININE 0.62   EGFR >90     Recent Labs     11/17/23  0612   ALBUMIN 4.3   ALKPHOS 50   ALT 16   AST 18   BILITOT 0.5       LIPID PANEL -   Recent Labs     11/17/23 0612   CHOL 221*   LDLCALC 141*   HDL 53.8   TRIG 129       Recent Labs     11/17/23 0612   HGBA1C 5.7*  5.7*           Assessment/Plan   1) chest pain: Unlikely cardiac in etiology.  If she or her  noticed what appeared to be apneic episodes then she should be reevaluated for sleep apnea.    2) atrial fibrillation status post ablation November 2017: Wearing a monitor.    3) dyslipidemia: Continue to observe    4) follow-up: 1 year or sooner if needed      Grant Lin MD  "

## 2024-01-17 LAB — BODY SURFACE AREA: 1.61 M2

## 2024-01-23 ENCOUNTER — TELEPHONE (OUTPATIENT)
Dept: CARDIOLOGY | Facility: CLINIC | Age: 74
End: 2024-01-23
Payer: MEDICARE

## 2024-01-23 NOTE — TELEPHONE ENCOUNTER
Prerna called this morning; she would like results of heart monitor placed in November. Please advise.

## 2024-12-02 PROBLEM — R07.9 CHEST PAIN: Status: RESOLVED | Noted: 2023-11-17 | Resolved: 2024-12-02

## 2024-12-02 NOTE — PROGRESS NOTES
Chief Complaint:   No chief complaint on file.     History Of Present Illness:    Prerna Watkins is a 74 y.o. female with a history of atrial fibrillation (A-fib ablation November 2017), sinus node dysfunction, and dyslipidemia here for follow-up.     Overall she is doing well.  She denies any exertional chest pain or shortness of breath.  She denies any palpitations.    Her  had his shoulder replaced however had a complication and had to have a redo surgery.  He is still recovering from that.  It has been a month since his second surgery.    Ambulatory monitor 11/17/2023: Predominantly sinus rhythm with no evidence of atrial fibrillation or SVT.  Rare PAC and PVC.    Echocardiogram 11/14/2023: EF 65 to 70%.  Grade I diastolic dysfunction.  Aortic valve sclerosis without stenosis.     Lexiscan nuclear stress test 5/12/2023: No evidence of ischemia or scar.  EF greater than 80%.     Echocardiogram 2/8/2018: EF 71%.  Grade 1 diastolic dysfunction.     A-fib ablation November 2017 by Dr. Curry     Allergies:  Cat dander, Hydrocodone, Levofloxacin, Other, Pollen extracts, and Nickel    Outpatient Medications:  Current Outpatient Medications   Medication Instructions    acetaminophen (TYLENOL) 650 mg, Every 4 hours PRN    ascorbic acid (VITAMIN C) 1,000 mg, Daily    calcium carbonate (Oscal) 500 mg calcium (1,250 mg) tablet 2 tablets, Daily    cholecalciferol (VITAMIN D-3) 50 mcg, Daily    cyanocobalamin (VITAMIN B-12) 1,000 mcg, Daily    fexofenadine-pseudoephedrine (Allegra-D 12 Hour)  mg 12 hr tablet Take by mouth.    fluticasone (Flonase) 50 mcg/actuation nasal spray Administer into affected nostril(s).    glucosamine HCl 1,500 mg tablet 2 tablets, Daily    meclizine (ANTIVERT) 25 mg, 3 times daily PRN    multivitamin with minerals (One Daily Multi-Vit w-Mineral) tablet Take by mouth.    pravastatin (PRAVACHOL) 80 mg, Daily    propylene glycoL (Systane Balance) 0.6 % drops 1 drop, 2 times daily PRN  "      Last Recorded Vitals:  Visit Vitals  /68 (BP Location: Right arm, Patient Position: Sitting)   Pulse 64   Ht 1.575 m (5' 2\")   Wt 58.1 kg (128 lb)   SpO2 95%   BMI 23.41 kg/m²   Smoking Status Never   BSA 1.59 m²      LASTWT(3):   Wt Readings from Last 3 Encounters:   12/03/24 58.1 kg (128 lb)   12/04/23 59 kg (130 lb)   11/17/23 61.3 kg (135 lb 2.3 oz)       Physical Exam:  In general: alert and in no acute distress.   HEENT: Carotid upstrokes normal with no bruits. JVP is normal.   Pulmonary: Clear to auscultation bilaterally.  Cardiovascular: S1, S2, regular. No appreciable murmurs, rubs or gallops.   Lower extremities: Warm. 2+ distal pulses. No edema.       Last Labs:  CBC -  Recent Labs     11/17/23  0612   WBC 12.5*   HGB 14.7   HCT 46.7*      MCV 94       CMP -  Recent Labs     11/17/23  0612      K 4.0      CO2 29   ANIONGAP 11   BUN 19   CREATININE 0.62   EGFR >90     Recent Labs     11/17/23  0612   ALBUMIN 4.3   ALKPHOS 50   ALT 16   AST 18   BILITOT 0.5       LIPID PANEL -   Recent Labs     11/17/23  0612   CHOL 221*   LDLCALC 141*   HDL 53.8   TRIG 129       Recent Labs     11/17/23  0612   HGBA1C 5.7*  5.7*           Assessment/Plan   1) atrial fibrillation s/p ablation November 2017: No evidence of recurrence by symptoms or by exam today.  No need for long-term oral anticoagulation at this time.     2) dyslipidemia: LDL has been stable on high intensity pravastatin.  At this point I do not see the need to adjust this medication.     3) follow-up: 1 year or sooner if needed        Grant Lin MD  "

## 2024-12-03 ENCOUNTER — APPOINTMENT (OUTPATIENT)
Dept: CARDIOLOGY | Facility: CLINIC | Age: 74
End: 2024-12-03
Payer: MEDICARE

## 2024-12-03 VITALS
HEART RATE: 64 BPM | OXYGEN SATURATION: 95 % | WEIGHT: 128 LBS | SYSTOLIC BLOOD PRESSURE: 112 MMHG | HEIGHT: 62 IN | BODY MASS INDEX: 23.55 KG/M2 | DIASTOLIC BLOOD PRESSURE: 68 MMHG

## 2024-12-03 DIAGNOSIS — E78.5 DYSLIPIDEMIA: ICD-10-CM

## 2024-12-03 DIAGNOSIS — R07.9 CHEST PAIN, UNSPECIFIED TYPE: ICD-10-CM

## 2024-12-03 DIAGNOSIS — I48.20 CHRONIC ATRIAL FIBRILLATION (MULTI): Primary | ICD-10-CM

## 2024-12-03 PROCEDURE — 99214 OFFICE O/P EST MOD 30 MIN: CPT | Performed by: INTERNAL MEDICINE

## 2024-12-03 PROCEDURE — 93010 ELECTROCARDIOGRAM REPORT: CPT | Performed by: INTERNAL MEDICINE

## 2024-12-03 PROCEDURE — 1160F RVW MEDS BY RX/DR IN RCRD: CPT | Performed by: INTERNAL MEDICINE

## 2024-12-03 PROCEDURE — 93005 ELECTROCARDIOGRAM TRACING: CPT | Performed by: INTERNAL MEDICINE

## 2024-12-03 PROCEDURE — 1159F MED LIST DOCD IN RCRD: CPT | Performed by: INTERNAL MEDICINE

## 2024-12-03 PROCEDURE — 1036F TOBACCO NON-USER: CPT | Performed by: INTERNAL MEDICINE

## 2024-12-03 RX ORDER — ACETAMINOPHEN 325 MG/1
650 TABLET ORAL EVERY 4 HOURS PRN
COMMUNITY
Start: 2023-11-17

## 2024-12-03 RX ORDER — MULTIVIT-MIN/FERROUS SULFATE 4.5 MG
TABLET ORAL
COMMUNITY

## 2024-12-03 RX ORDER — FEXOFENADINE HCL AND PSEUDOEPHEDRINE HCI 60; 120 MG/1; MG/1
TABLET, EXTENDED RELEASE ORAL
COMMUNITY